# Patient Record
Sex: MALE | Race: WHITE | URBAN - NONMETROPOLITAN AREA
[De-identification: names, ages, dates, MRNs, and addresses within clinical notes are randomized per-mention and may not be internally consistent; named-entity substitution may affect disease eponyms.]

---

## 2017-08-26 ENCOUNTER — OFFICE VISIT - GICH (OUTPATIENT)
Dept: FAMILY MEDICINE | Facility: OTHER | Age: 82
End: 2017-08-26

## 2017-08-26 ENCOUNTER — HISTORY (OUTPATIENT)
Dept: FAMILY MEDICINE | Facility: OTHER | Age: 82
End: 2017-08-26

## 2017-08-26 DIAGNOSIS — Z95.1 PRESENCE OF AORTOCORONARY BYPASS GRAFT: ICD-10-CM

## 2017-08-26 DIAGNOSIS — R39.89 OTHER SYMPTOMS AND SIGNS INVOLVING THE GENITOURINARY SYSTEM: ICD-10-CM

## 2017-08-26 DIAGNOSIS — Z98.890 OTHER SPECIFIED POSTPROCEDURAL STATES: ICD-10-CM

## 2017-08-26 DIAGNOSIS — Z95.0 PRESENCE OF CARDIAC PACEMAKER: ICD-10-CM

## 2017-08-26 DIAGNOSIS — R31.9 HEMATURIA: ICD-10-CM

## 2017-08-26 DIAGNOSIS — Z86.79 PERSONAL HISTORY OF OTHER DISEASES OF THE CIRCULATORY SYSTEM (CODE): ICD-10-CM

## 2017-08-26 DIAGNOSIS — Z85.46 PERSONAL HISTORY OF MALIGNANT NEOPLASM OF PROSTATE: ICD-10-CM

## 2017-08-26 LAB
BACTERIA URINE: NORMAL BACTERIA/HPF
BILIRUB UR QL: NEGATIVE
CLARITY, URINE: CLEAR CLARITY
COLOR UR: YELLOW COLOR
EPITHELIAL CELLS: NORMAL EPI/HPF
GLUCOSE URINE: NEGATIVE MG/DL
KETONES UR QL: NEGATIVE MG/DL
LEUKOCYTE ESTERASE URINE: NEGATIVE
NITRITE UR QL STRIP: NEGATIVE
OCCULT BLOOD,URINE - HISTORICAL: ABNORMAL
PH UR: 5.5 [PH]
PROTEIN QUALITATIVE,URINE - HISTORICAL: 30 MG/DL
RBC - HISTORICAL: NORMAL /HPF
SP GR UR STRIP: 1.01
UROBILINOGEN,QUALITATIVE - HISTORICAL: NORMAL EU/DL
WBC - HISTORICAL: NORMAL /HPF

## 2017-12-28 NOTE — PATIENT INSTRUCTIONS
Patient Information     Patient Name MRN Victorino Macdonald [id number] Male 1935      Patient Instructions by Simran Small NP at 2017 12:15 PM     Author:  Simran Samll NP Service:  (none) Author Type:  PHYS- Nurse Practitioner     Filed:  2017  9:21 AM Encounter Date:  2017 Status:  Signed     :  Simran Small NP (PHYS- Nurse Practitioner)            Increase water intake  Please follow up in clinic in 2 days if symptoms worsening or not improving

## 2017-12-28 NOTE — PROGRESS NOTES
"Patient Information     Patient Name MRN Sex Victorino Wang [id number] Male 1935      Progress Notes by Simran Small NP at 2017 12:15 PM     Author:  Simran Small NP Service:  (none) Author Type:  PHYS- Nurse Practitioner     Filed:  2017  9:34 AM Encounter Date:  2017 Status:  Signed     :  Simran Small NP (PHYS- Nurse Practitioner)            HPI:  Nursing Notes:   Darlyn Mckenzie  2017  1:00 PM  Signed  Urine analysis initiated per verbal order that was read back and verified.   Patient states he has been having kidney infection sx x 5 days, lower left and right flank pain  Darlyn Mckenzie LPN...................2017   12:48 PM       Victorino Mello is a 82 y.o. male who presents to clinic today for left flank discomfort that comes and goes that started four days ago. Left side is worse than right. Yesterday was quite fatigued, sleeping over 13 hours, seems to feel somewhat better today. Denies burning with urination, fevers or blood in urine. No history of bladder infections or kidney stones. Eating and drinking without difficulty.     No past medical history on file.  No past surgical history on file.  Social History        Substance Use Topics          Smoking status:   Former Smoker      Types:  Cigarettes      Quit date:        Smokeless tobacco:   Never Used      Alcohol use   1.2 - 2.4 oz/week     1 - 2 Cans of beer, 1 - 2 Standard drinks or equivalent per week        Comment: 1-2 a week       No current outpatient prescriptions on file.     No current facility-administered medications for this visit.      Medications have been reviewed by me and are current to the best of my knowledge and ability.    No Known Allergies    ROS:  Refer to HPI    /82  Pulse 76  Temp 96  F (35.6  C) (Tympanic)   Resp 16  Ht 1.765 m (5' 9.5\")  Wt 91.3 kg (201 lb 4.8 oz)  BMI 29.3 kg/m2    EXAM:  General Appearance: Well " appearing male, appropriate appearance for age. No acute distress  Respiratory: normal chest wall and respirations.  Normal effort.  Clear to auscultation bilaterally  Cardiac: RRR   Musculoskeletal: bilateral CVA tenderness, left > right  Psychological: normal affect, alert and pleasant    ASSESSMENT/PLAN:    ICD-10-CM    1. Hematuria R31.9 URINE CULTURE      URINE CULTURE   2. Urinary problem R39.89 URINALYSIS W REFLEX MICROSCOPIC IF POSITIVE      URINALYSIS W REFLEX MICROSCOPIC IF POSITIVE      URINALYSIS MICROSCOPIC      URINALYSIS MICROSCOPIC   3. 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 History of prostate cancer Z85.46    5. Pacemaker Z95.0    6. History of cardiac radiofrequency ablation Z98.890    7. History of atrial fibrillation Z86.79    Flank pain  On exam: well appearing male without fever, bilateral flank pain, left>right  Results for orders placed or performed in visit on 08/26/17      URINALYSIS W REFLEX MICROSCOPIC IF POSITIVE      Result  Value Ref Range    COLOR                     Yellow Yellow Color    CLARITY                   Clear Clear Clarity    SPECIFIC GRAVITY,URINE    1.015 1.010, 1.015, 1.020, 1.025                    PH,URINE                  5.5 6.0, 7.0, 8.0, 5.5, 6.5, 7.5, 8.5                    UROBILINOGEN,QUALITATIVE  Normal Normal EU/dl    PROTEIN, URINE 30 (A) Negative mg/dL    GLUCOSE, URINE Negative Negative mg/dL    KETONES,URINE             Negative Negative mg/dL    BILIRUBIN,URINE           Negative Negative                    OCCULT BLOOD,URINE        Trace (A) Negative                    NITRITE                   Negative Negative                    LEUKOCYTE ESTERASE        Negative Negative                   URINALYSIS MICROSCOPIC      Result  Value Ref Range    RBC 0-2 0-2, None Seen /HPF    WBC None Seen 0-2, 3-5, None Seen /HPF    BACTERIA                  None Seen None Seen, Rare, Occasional, Few Bacteria/HPF    EPITHELIAL CELLS          Few  None Seen, Few Epi/HPF   Urine sent for culture  Diagnosis: Hematuria  Increase fluid intake  Follow up in clinic in 2 days if symptoms don't improve    Patient Instructions   Increase water intake  Please follow up in clinic in 2 days if symptoms worsening or not improving

## 2017-12-30 NOTE — NURSING NOTE
Patient Information     Patient Name MRN Sex Victorino Wang [id number] Male 1935      Nursing Note by Darlyn Mckenzie at 2017 12:15 PM     Author:  Darlyn Mckenzie Service:  (none) Author Type:  (none)     Filed:  2017  1:00 PM Encounter Date:  2017 Status:  Signed     :  Darlyn Mckenzie            Urine analysis initiated per verbal order that was read back and verified.   Patient states he has been having kidney infection sx x 5 days, lower left and right flank pain  Darlyn Mckenzie LPN...................2017   12:48 PM

## 2018-01-25 VITALS
HEIGHT: 70 IN | RESPIRATION RATE: 16 BRPM | TEMPERATURE: 96 F | DIASTOLIC BLOOD PRESSURE: 82 MMHG | SYSTOLIC BLOOD PRESSURE: 128 MMHG | WEIGHT: 201.3 LBS | BODY MASS INDEX: 28.82 KG/M2 | HEART RATE: 76 BPM

## 2018-02-19 ENCOUNTER — DOCUMENTATION ONLY (OUTPATIENT)
Dept: FAMILY MEDICINE | Facility: OTHER | Age: 83
End: 2018-02-19

## 2018-02-19 PROBLEM — Z86.79 HISTORY OF ATRIAL FIBRILLATION: Status: ACTIVE | Noted: 2017-08-27

## 2018-02-19 PROBLEM — Z85.46 HISTORY OF PROSTATE CANCER: Status: ACTIVE | Noted: 2017-08-27

## 2018-02-19 PROBLEM — Z95.1 HISTORY OF FIVE VESSEL CORONARY ARTERY BYPASS: Status: ACTIVE | Noted: 2017-08-27

## 2018-02-19 PROBLEM — Z98.890 HISTORY OF CARDIAC RADIOFREQUENCY ABLATION: Status: ACTIVE | Noted: 2017-08-27

## 2018-02-19 PROBLEM — Z95.0 PACEMAKER: Status: ACTIVE | Noted: 2017-08-27

## 2018-02-19 RX ORDER — ATORVASTATIN CALCIUM 40 MG/1
40 TABLET, FILM COATED ORAL DAILY
COMMUNITY
Start: 2017-08-26

## 2018-02-19 RX ORDER — ALLOPURINOL 300 MG/1
300 TABLET ORAL DAILY
COMMUNITY
Start: 2017-08-26

## 2018-02-19 RX ORDER — BENAZEPRIL HYDROCHLORIDE 20 MG/1
20 TABLET ORAL
COMMUNITY
Start: 2017-08-26